# Patient Record
Sex: MALE
[De-identification: names, ages, dates, MRNs, and addresses within clinical notes are randomized per-mention and may not be internally consistent; named-entity substitution may affect disease eponyms.]

---

## 2017-05-22 ENCOUNTER — HOSPITAL (OUTPATIENT)
Dept: OTHER | Age: 77
End: 2017-05-22
Attending: FAMILY MEDICINE

## 2017-06-16 ENCOUNTER — HOSPITAL (OUTPATIENT)
Dept: OTHER | Age: 77
End: 2017-06-16
Attending: FAMILY MEDICINE

## 2017-06-16 LAB
DEVICE SN: NORMAL
POC_GLU: 79 MG/DL (ref 70–99)
TECH_ID: NORMAL

## 2017-08-09 ENCOUNTER — LAB ENCOUNTER (OUTPATIENT)
Dept: LAB | Facility: HOSPITAL | Age: 77
End: 2017-08-09
Attending: ORTHOPAEDIC SURGERY
Payer: MEDICARE

## 2017-08-09 DIAGNOSIS — Z01.818 PRE-OP TESTING: ICD-10-CM

## 2017-08-09 LAB
ANTIBODY SCREEN: NEGATIVE
RH BLOOD TYPE: POSITIVE

## 2017-08-09 PROCEDURE — 86850 RBC ANTIBODY SCREEN: CPT

## 2017-08-09 PROCEDURE — 86900 BLOOD TYPING SEROLOGIC ABO: CPT

## 2017-08-09 PROCEDURE — 86901 BLOOD TYPING SEROLOGIC RH(D): CPT

## 2017-08-09 PROCEDURE — 36415 COLL VENOUS BLD VENIPUNCTURE: CPT

## 2017-08-12 RX ORDER — TAMSULOSIN HYDROCHLORIDE 0.4 MG/1
0.4 CAPSULE ORAL DAILY
COMMUNITY

## 2017-08-12 RX ORDER — MV-MN/OM3/DHA/EPA/FISH/LUT/ZEA 250-5-1 MG
1 CAPSULE ORAL DAILY
COMMUNITY

## 2017-08-12 RX ORDER — GLUCOSAMINE SULFATE 500 MG
3 CAPSULE ORAL DAILY
COMMUNITY

## 2017-08-12 RX ORDER — VALACYCLOVIR HYDROCHLORIDE 500 MG/1
500 TABLET, FILM COATED ORAL DAILY
COMMUNITY

## 2017-08-12 RX ORDER — ACETAMINOPHEN 325 MG/1
650 TABLET ORAL ONCE
Status: CANCELLED | OUTPATIENT
Start: 2017-08-12 | End: 2017-08-12

## 2017-08-12 RX ORDER — VIT A/VIT C/VIT E/ZINC/COPPER 2148-113
1 TABLET ORAL DAILY
COMMUNITY

## 2017-08-12 RX ORDER — UBIDECARENONE 75 MG
250 CAPSULE ORAL DAILY
COMMUNITY

## 2017-08-12 RX ORDER — NIACIN 500 MG
500 TABLET ORAL 2 TIMES DAILY WITH MEALS
COMMUNITY

## 2017-08-12 RX ORDER — MULTIVITAMIN WITH IRON
100 TABLET ORAL DAILY
COMMUNITY

## 2017-08-12 RX ORDER — FAMOTIDINE 20 MG/1
20 TABLET ORAL ONCE
Status: CANCELLED | OUTPATIENT
Start: 2017-08-12 | End: 2017-08-12

## 2017-08-12 RX ORDER — LOVASTATIN 40 MG/1
40 TABLET ORAL DAILY
COMMUNITY

## 2017-08-12 RX ORDER — COVID-19 ANTIGEN TEST
220 KIT MISCELLANEOUS 2 TIMES DAILY
Status: ON HOLD | COMMUNITY
End: 2017-08-24

## 2017-08-12 RX ORDER — GLUCOSAMINE HCL 500 MG
1 TABLET ORAL DAILY
COMMUNITY

## 2017-08-12 RX ORDER — LISINOPRIL 10 MG/1
10 TABLET ORAL DAILY
COMMUNITY

## 2017-08-22 ENCOUNTER — APPOINTMENT (OUTPATIENT)
Dept: GENERAL RADIOLOGY | Facility: HOSPITAL | Age: 77
DRG: 470 | End: 2017-08-22
Attending: ORTHOPAEDIC SURGERY
Payer: MEDICARE

## 2017-08-22 ENCOUNTER — ANESTHESIA EVENT (OUTPATIENT)
Dept: SURGERY | Facility: HOSPITAL | Age: 77
DRG: 470 | End: 2017-08-22
Payer: MEDICARE

## 2017-08-22 ENCOUNTER — ANESTHESIA (OUTPATIENT)
Dept: SURGERY | Facility: HOSPITAL | Age: 77
DRG: 470 | End: 2017-08-22
Payer: MEDICARE

## 2017-08-22 ENCOUNTER — SURGERY (OUTPATIENT)
Age: 77
End: 2017-08-22

## 2017-08-22 ENCOUNTER — HOSPITAL ENCOUNTER (INPATIENT)
Facility: HOSPITAL | Age: 77
LOS: 2 days | Discharge: HOME HEALTH | DRG: 470 | End: 2017-08-24
Attending: ORTHOPAEDIC SURGERY | Admitting: ORTHOPAEDIC SURGERY
Payer: MEDICARE

## 2017-08-22 DIAGNOSIS — M16.11 PRIMARY OSTEOARTHRITIS OF RIGHT HIP: Primary | ICD-10-CM

## 2017-08-22 PROBLEM — I10 ESSENTIAL HYPERTENSION: Chronic | Status: ACTIVE | Noted: 2017-08-22

## 2017-08-22 PROBLEM — E78.5 HYPERLIPIDEMIA: Chronic | Status: ACTIVE | Noted: 2017-08-22

## 2017-08-22 PROBLEM — E11.9 DIABETES MELLITUS, TYPE 2 (HCC): Chronic | Status: ACTIVE | Noted: 2017-08-22

## 2017-08-22 LAB
GLUCOSE BLDC GLUCOMTR-MCNC: 104 MG/DL (ref 70–99)
GLUCOSE BLDC GLUCOMTR-MCNC: 180 MG/DL (ref 70–99)
GLUCOSE BLDC GLUCOMTR-MCNC: 193 MG/DL (ref 70–99)

## 2017-08-22 PROCEDURE — 0SR90JA REPLACEMENT OF RIGHT HIP JOINT WITH SYNTHETIC SUBSTITUTE, UNCEMENTED, OPEN APPROACH: ICD-10-PCS | Performed by: ORTHOPAEDIC SURGERY

## 2017-08-22 PROCEDURE — 73502 X-RAY EXAM HIP UNI 2-3 VIEWS: CPT | Performed by: ORTHOPAEDIC SURGERY

## 2017-08-22 PROCEDURE — 76001 XR C-ARM FLUORO >1 HOUR  (CPT=76001): CPT | Performed by: ORTHOPAEDIC SURGERY

## 2017-08-22 PROCEDURE — 99232 SBSQ HOSP IP/OBS MODERATE 35: CPT | Performed by: HOSPITALIST

## 2017-08-22 DEVICE — PINNACLE CANCELLOUS BONE SCREW 6.5MM X 30MM
Type: IMPLANTABLE DEVICE | Site: HIP | Status: FUNCTIONAL
Brand: PINNACLE

## 2017-08-22 DEVICE — PINNACLE HIP SOLUTIONS ALTRX POLYETHYLENE ACETABULAR LINER +4 NEUTRAL 36MM ID 58MM OD
Type: IMPLANTABLE DEVICE | Site: HIP | Status: FUNCTIONAL
Brand: PINNACLE ALTRX

## 2017-08-22 DEVICE — PINNACLE GRIPTION ACETABULAR SHELL SECTOR 58MM OD
Type: IMPLANTABLE DEVICE | Site: HIP | Status: FUNCTIONAL
Brand: PINNACLE GRIPTION

## 2017-08-22 DEVICE — PINNACLE CANCELLOUS BONE SCREW 6.5MM X 20MM
Type: IMPLANTABLE DEVICE | Site: HIP | Status: FUNCTIONAL
Brand: PINNACLE

## 2017-08-22 DEVICE — BIOLOX DELTA CERAMIC FEMORAL HEAD +1.5 36MM DIA 12/14 TAPER
Type: IMPLANTABLE DEVICE | Site: HIP | Status: FUNCTIONAL
Brand: BIOLOX DELTA

## 2017-08-22 DEVICE — PINNACLE CANCELLOUS BONE SCREW 6.5MM X 35MM
Type: IMPLANTABLE DEVICE | Site: HIP | Status: FUNCTIONAL
Brand: PINNACLE

## 2017-08-22 RX ORDER — SODIUM CHLORIDE, SODIUM LACTATE, POTASSIUM CHLORIDE, CALCIUM CHLORIDE 600; 310; 30; 20 MG/100ML; MG/100ML; MG/100ML; MG/100ML
INJECTION, SOLUTION INTRAVENOUS CONTINUOUS
Status: DISCONTINUED | OUTPATIENT
Start: 2017-08-22 | End: 2017-08-22 | Stop reason: ALTCHOICE

## 2017-08-22 RX ORDER — NALOXONE HYDROCHLORIDE 0.4 MG/ML
0.08 INJECTION, SOLUTION INTRAMUSCULAR; INTRAVENOUS; SUBCUTANEOUS
Status: ACTIVE | OUTPATIENT
Start: 2017-08-22 | End: 2017-08-23

## 2017-08-22 RX ORDER — HYDROMORPHONE HYDROCHLORIDE 1 MG/ML
0.8 INJECTION, SOLUTION INTRAMUSCULAR; INTRAVENOUS; SUBCUTANEOUS EVERY 2 HOUR PRN
Status: DISCONTINUED | OUTPATIENT
Start: 2017-08-22 | End: 2017-08-24

## 2017-08-22 RX ORDER — DIPHENHYDRAMINE HYDROCHLORIDE 50 MG/ML
12.5 INJECTION INTRAMUSCULAR; INTRAVENOUS EVERY 4 HOURS PRN
Status: DISCONTINUED | OUTPATIENT
Start: 2017-08-22 | End: 2017-08-24

## 2017-08-22 RX ORDER — GLUCOSAMINE SULFATE 500 MG
1 CAPSULE ORAL DAILY
Status: DISCONTINUED | OUTPATIENT
Start: 2017-08-22 | End: 2017-08-22 | Stop reason: RX

## 2017-08-22 RX ORDER — VALACYCLOVIR HYDROCHLORIDE 500 MG/1
500 TABLET, FILM COATED ORAL DAILY
Status: DISCONTINUED | OUTPATIENT
Start: 2017-08-22 | End: 2017-08-22 | Stop reason: RX

## 2017-08-22 RX ORDER — ONDANSETRON 2 MG/ML
4 INJECTION INTRAMUSCULAR; INTRAVENOUS ONCE AS NEEDED
Status: DISCONTINUED | OUTPATIENT
Start: 2017-08-22 | End: 2017-08-22 | Stop reason: HOSPADM

## 2017-08-22 RX ORDER — SCOLOPAMINE TRANSDERMAL SYSTEM 1 MG/1
1 PATCH, EXTENDED RELEASE TRANSDERMAL ONCE
Status: DISCONTINUED | OUTPATIENT
Start: 2017-08-22 | End: 2017-08-22

## 2017-08-22 RX ORDER — ALFUZOSIN HYDROCHLORIDE 10 MG/1
10 TABLET, EXTENDED RELEASE ORAL
Status: DISCONTINUED | OUTPATIENT
Start: 2017-08-23 | End: 2017-08-24

## 2017-08-22 RX ORDER — DIPHENHYDRAMINE HCL 25 MG
25 CAPSULE ORAL EVERY 4 HOURS PRN
Status: ACTIVE | OUTPATIENT
Start: 2017-08-22 | End: 2017-08-23

## 2017-08-22 RX ORDER — ASPIRIN 325 MG
325 TABLET ORAL 2 TIMES DAILY
Status: DISCONTINUED | OUTPATIENT
Start: 2017-08-23 | End: 2017-08-24

## 2017-08-22 RX ORDER — POLYETHYLENE GLYCOL 3350 17 G/17G
17 POWDER, FOR SOLUTION ORAL DAILY PRN
Status: DISCONTINUED | OUTPATIENT
Start: 2017-08-22 | End: 2017-08-24

## 2017-08-22 RX ORDER — HYDROMORPHONE HYDROCHLORIDE 1 MG/ML
0.5 INJECTION, SOLUTION INTRAMUSCULAR; INTRAVENOUS; SUBCUTANEOUS EVERY 5 MIN PRN
Status: DISCONTINUED | OUTPATIENT
Start: 2017-08-22 | End: 2017-08-24

## 2017-08-22 RX ORDER — LISINOPRIL 10 MG/1
10 TABLET ORAL DAILY
Status: DISCONTINUED | OUTPATIENT
Start: 2017-08-22 | End: 2017-08-24

## 2017-08-22 RX ORDER — METOCLOPRAMIDE 10 MG/1
10 TABLET ORAL ONCE
Status: DISCONTINUED | OUTPATIENT
Start: 2017-08-22 | End: 2017-08-22 | Stop reason: HOSPADM

## 2017-08-22 RX ORDER — ACETAMINOPHEN 325 MG/1
650 TABLET ORAL EVERY 6 HOURS
Status: DISCONTINUED | OUTPATIENT
Start: 2017-08-23 | End: 2017-08-24

## 2017-08-22 RX ORDER — ATORVASTATIN CALCIUM 40 MG/1
40 TABLET, FILM COATED ORAL NIGHTLY
Status: DISCONTINUED | OUTPATIENT
Start: 2017-08-22 | End: 2017-08-22 | Stop reason: ALTCHOICE

## 2017-08-22 RX ORDER — ONDANSETRON 2 MG/ML
4 INJECTION INTRAMUSCULAR; INTRAVENOUS EVERY 4 HOURS PRN
Status: DISCONTINUED | OUTPATIENT
Start: 2017-08-22 | End: 2017-08-24

## 2017-08-22 RX ORDER — TAMSULOSIN HYDROCHLORIDE 0.4 MG/1
0.4 CAPSULE ORAL DAILY
Status: DISCONTINUED | OUTPATIENT
Start: 2017-08-22 | End: 2017-08-22 | Stop reason: RX

## 2017-08-22 RX ORDER — VITS A,C,E/LUTEIN/MINERALS 300MCG-200
1 TABLET ORAL DAILY
Status: DISCONTINUED | OUTPATIENT
Start: 2017-08-22 | End: 2017-08-24

## 2017-08-22 RX ORDER — NIACIN 500 MG
500 TABLET ORAL 2 TIMES DAILY WITH MEALS
Status: DISCONTINUED | OUTPATIENT
Start: 2017-08-22 | End: 2017-08-24

## 2017-08-22 RX ORDER — VIT A/VIT C/VIT E/ZINC/COPPER 2148-113
1 TABLET ORAL DAILY
Status: DISCONTINUED | OUTPATIENT
Start: 2017-08-22 | End: 2017-08-22 | Stop reason: RX

## 2017-08-22 RX ORDER — TRAMADOL HYDROCHLORIDE 50 MG/1
50 TABLET ORAL 2 TIMES DAILY PRN
Status: ON HOLD | COMMUNITY
End: 2017-08-24

## 2017-08-22 RX ORDER — FAMOTIDINE 10 MG/ML
20 INJECTION, SOLUTION INTRAVENOUS 2 TIMES DAILY
Status: DISCONTINUED | OUTPATIENT
Start: 2017-08-22 | End: 2017-08-24

## 2017-08-22 RX ORDER — CELECOXIB 200 MG/1
200 CAPSULE ORAL EVERY 12 HOURS SCHEDULED
Status: DISCONTINUED | OUTPATIENT
Start: 2017-08-22 | End: 2017-08-24

## 2017-08-22 RX ORDER — DOCUSATE SODIUM 100 MG/1
100 CAPSULE, LIQUID FILLED ORAL 2 TIMES DAILY
Status: DISCONTINUED | OUTPATIENT
Start: 2017-08-22 | End: 2017-08-24

## 2017-08-22 RX ORDER — DEXTROSE MONOHYDRATE 25 G/50ML
50 INJECTION, SOLUTION INTRAVENOUS AS NEEDED
Status: DISCONTINUED | OUTPATIENT
Start: 2017-08-22 | End: 2017-08-22

## 2017-08-22 RX ORDER — MORPHINE SULFATE 2 MG/ML
2 INJECTION, SOLUTION INTRAMUSCULAR; INTRAVENOUS EVERY 10 MIN PRN
Status: DISCONTINUED | OUTPATIENT
Start: 2017-08-22 | End: 2017-08-22 | Stop reason: HOSPADM

## 2017-08-22 RX ORDER — HYDROMORPHONE HYDROCHLORIDE 1 MG/ML
0.2 INJECTION, SOLUTION INTRAMUSCULAR; INTRAVENOUS; SUBCUTANEOUS EVERY 5 MIN PRN
Status: DISCONTINUED | OUTPATIENT
Start: 2017-08-22 | End: 2017-08-22 | Stop reason: HOSPADM

## 2017-08-22 RX ORDER — HYDROMORPHONE HYDROCHLORIDE 1 MG/ML
0.4 INJECTION, SOLUTION INTRAMUSCULAR; INTRAVENOUS; SUBCUTANEOUS EVERY 5 MIN PRN
Status: DISCONTINUED | OUTPATIENT
Start: 2017-08-22 | End: 2017-08-22 | Stop reason: HOSPADM

## 2017-08-22 RX ORDER — SODIUM CHLORIDE, SODIUM LACTATE, POTASSIUM CHLORIDE, CALCIUM CHLORIDE 600; 310; 30; 20 MG/100ML; MG/100ML; MG/100ML; MG/100ML
INJECTION, SOLUTION INTRAVENOUS CONTINUOUS
Status: DISCONTINUED | OUTPATIENT
Start: 2017-08-22 | End: 2017-08-22 | Stop reason: HOSPADM

## 2017-08-22 RX ORDER — DIPHENHYDRAMINE HYDROCHLORIDE 50 MG/ML
12.5 INJECTION INTRAMUSCULAR; INTRAVENOUS EVERY 4 HOURS PRN
Status: ACTIVE | OUTPATIENT
Start: 2017-08-22 | End: 2017-08-23

## 2017-08-22 RX ORDER — ATORVASTATIN CALCIUM 10 MG/1
10 TABLET, FILM COATED ORAL NIGHTLY
Status: DISCONTINUED | OUTPATIENT
Start: 2017-08-22 | End: 2017-08-24

## 2017-08-22 RX ORDER — SODIUM CHLORIDE, SODIUM LACTATE, POTASSIUM CHLORIDE, CALCIUM CHLORIDE 600; 310; 30; 20 MG/100ML; MG/100ML; MG/100ML; MG/100ML
INJECTION, SOLUTION INTRAVENOUS CONTINUOUS
Status: DISCONTINUED | OUTPATIENT
Start: 2017-08-22 | End: 2017-08-24

## 2017-08-22 RX ORDER — HYDROMORPHONE HYDROCHLORIDE 1 MG/ML
0.2 INJECTION, SOLUTION INTRAMUSCULAR; INTRAVENOUS; SUBCUTANEOUS EVERY 2 HOUR PRN
Status: DISCONTINUED | OUTPATIENT
Start: 2017-08-22 | End: 2017-08-24

## 2017-08-22 RX ORDER — DEXAMETHASONE SODIUM PHOSPHATE 4 MG/ML
10 VIAL (ML) INJECTION ONCE
Status: COMPLETED | OUTPATIENT
Start: 2017-08-22 | End: 2017-08-22

## 2017-08-22 RX ORDER — HYDROMORPHONE HYDROCHLORIDE 1 MG/ML
0.4 INJECTION, SOLUTION INTRAMUSCULAR; INTRAVENOUS; SUBCUTANEOUS EVERY 2 HOUR PRN
Status: DISCONTINUED | OUTPATIENT
Start: 2017-08-22 | End: 2017-08-24

## 2017-08-22 RX ORDER — NALBUPHINE HCL 10 MG/ML
2.5 AMPUL (ML) INJECTION EVERY 4 HOURS PRN
Status: ACTIVE | OUTPATIENT
Start: 2017-08-22 | End: 2017-08-23

## 2017-08-22 RX ORDER — TRAMADOL HYDROCHLORIDE 50 MG/1
TABLET ORAL EVERY 6 HOURS
Status: DISCONTINUED | OUTPATIENT
Start: 2017-08-22 | End: 2017-08-24

## 2017-08-22 RX ORDER — PREGABALIN 75 MG/1
75 CAPSULE ORAL EVERY EVENING
Status: DISCONTINUED | OUTPATIENT
Start: 2017-08-22 | End: 2017-08-24

## 2017-08-22 RX ORDER — HALOPERIDOL 5 MG/ML
0.5 INJECTION INTRAMUSCULAR ONCE AS NEEDED
Status: ACTIVE | OUTPATIENT
Start: 2017-08-22 | End: 2017-08-22

## 2017-08-22 RX ORDER — SODIUM CHLORIDE 0.9 % (FLUSH) 0.9 %
10 SYRINGE (ML) INJECTION AS NEEDED
Status: DISCONTINUED | OUTPATIENT
Start: 2017-08-22 | End: 2017-08-24

## 2017-08-22 RX ORDER — PHENYLEPHRINE HCL 10 MG/ML
VIAL (ML) INJECTION AS NEEDED
Status: DISCONTINUED | OUTPATIENT
Start: 2017-08-22 | End: 2017-08-22 | Stop reason: SURG

## 2017-08-22 RX ORDER — BUPIVACAINE HYDROCHLORIDE 7.5 MG/ML
INJECTION, SOLUTION INTRASPINAL AS NEEDED
Status: DISCONTINUED | OUTPATIENT
Start: 2017-08-22 | End: 2017-08-22 | Stop reason: SURG

## 2017-08-22 RX ORDER — ACETAMINOPHEN 10 MG/ML
1000 INJECTION, SOLUTION INTRAVENOUS EVERY 6 HOURS
Status: COMPLETED | OUTPATIENT
Start: 2017-08-22 | End: 2017-08-23

## 2017-08-22 RX ORDER — DIPHENHYDRAMINE HCL 25 MG
25 CAPSULE ORAL EVERY 4 HOURS PRN
Status: DISCONTINUED | OUTPATIENT
Start: 2017-08-22 | End: 2017-08-24

## 2017-08-22 RX ORDER — MORPHINE SULFATE 4 MG/ML
4 INJECTION, SOLUTION INTRAMUSCULAR; INTRAVENOUS EVERY 10 MIN PRN
Status: DISCONTINUED | OUTPATIENT
Start: 2017-08-22 | End: 2017-08-22 | Stop reason: HOSPADM

## 2017-08-22 RX ORDER — MORPHINE SULFATE 4 MG/ML
6 INJECTION, SOLUTION INTRAMUSCULAR; INTRAVENOUS EVERY 10 MIN PRN
Status: DISCONTINUED | OUTPATIENT
Start: 2017-08-22 | End: 2017-08-22 | Stop reason: HOSPADM

## 2017-08-22 RX ORDER — HYDROMORPHONE HYDROCHLORIDE 1 MG/ML
0.6 INJECTION, SOLUTION INTRAMUSCULAR; INTRAVENOUS; SUBCUTANEOUS EVERY 5 MIN PRN
Status: DISCONTINUED | OUTPATIENT
Start: 2017-08-22 | End: 2017-08-22 | Stop reason: HOSPADM

## 2017-08-22 RX ORDER — HALOPERIDOL 5 MG/ML
0.25 INJECTION INTRAMUSCULAR ONCE AS NEEDED
Status: DISCONTINUED | OUTPATIENT
Start: 2017-08-22 | End: 2017-08-22 | Stop reason: HOSPADM

## 2017-08-22 RX ORDER — FAMOTIDINE 40 MG/1
40 TABLET, FILM COATED ORAL ONCE
Status: COMPLETED | OUTPATIENT
Start: 2017-08-22 | End: 2017-08-22

## 2017-08-22 RX ORDER — CYCLOBENZAPRINE HCL 5 MG
5 TABLET ORAL 2 TIMES DAILY
Status: ON HOLD | COMMUNITY
End: 2017-08-24

## 2017-08-22 RX ORDER — BISACODYL 10 MG
10 SUPPOSITORY, RECTAL RECTAL
Status: DISCONTINUED | OUTPATIENT
Start: 2017-08-22 | End: 2017-08-24

## 2017-08-22 RX ORDER — PREGABALIN 75 MG/1
75 CAPSULE ORAL ONCE
Status: COMPLETED | OUTPATIENT
Start: 2017-08-22 | End: 2017-08-22

## 2017-08-22 RX ORDER — ACYCLOVIR 400 MG/1
400 TABLET ORAL 2 TIMES DAILY
Status: DISCONTINUED | OUTPATIENT
Start: 2017-08-22 | End: 2017-08-24

## 2017-08-22 RX ORDER — CHOLECALCIFEROL (VITAMIN D3) 125 MCG
250 CAPSULE ORAL DAILY
Status: DISCONTINUED | OUTPATIENT
Start: 2017-08-22 | End: 2017-08-24

## 2017-08-22 RX ORDER — FAMOTIDINE 20 MG/1
20 TABLET ORAL 2 TIMES DAILY
Status: DISCONTINUED | OUTPATIENT
Start: 2017-08-22 | End: 2017-08-24

## 2017-08-22 RX ORDER — ACETAMINOPHEN 10 MG/ML
1000 INJECTION, SOLUTION INTRAVENOUS ONCE
Status: COMPLETED | OUTPATIENT
Start: 2017-08-22 | End: 2017-08-22

## 2017-08-22 RX ORDER — HYDROMORPHONE HYDROCHLORIDE 1 MG/ML
0.4 INJECTION, SOLUTION INTRAMUSCULAR; INTRAVENOUS; SUBCUTANEOUS
Status: ACTIVE | OUTPATIENT
Start: 2017-08-22 | End: 2017-08-23

## 2017-08-22 RX ORDER — DEXTROSE MONOHYDRATE 25 G/50ML
50 INJECTION, SOLUTION INTRAVENOUS AS NEEDED
Status: DISCONTINUED | OUTPATIENT
Start: 2017-08-22 | End: 2017-08-24

## 2017-08-22 RX ORDER — METOCLOPRAMIDE HYDROCHLORIDE 5 MG/ML
10 INJECTION INTRAMUSCULAR; INTRAVENOUS EVERY 6 HOURS PRN
Status: DISCONTINUED | OUTPATIENT
Start: 2017-08-22 | End: 2017-08-24

## 2017-08-22 RX ORDER — MAGNESIUM HYDROXIDE 1200 MG/15ML
LIQUID ORAL CONTINUOUS PRN
Status: DISCONTINUED | OUTPATIENT
Start: 2017-08-22 | End: 2017-08-22

## 2017-08-22 RX ORDER — SODIUM PHOSPHATE, DIBASIC AND SODIUM PHOSPHATE, MONOBASIC 7; 19 G/133ML; G/133ML
1 ENEMA RECTAL ONCE AS NEEDED
Status: DISCONTINUED | OUTPATIENT
Start: 2017-08-22 | End: 2017-08-24

## 2017-08-22 RX ORDER — MELATONIN
325
Status: DISCONTINUED | OUTPATIENT
Start: 2017-08-23 | End: 2017-08-24

## 2017-08-22 RX ORDER — DIPHENHYDRAMINE HYDROCHLORIDE 50 MG/ML
25 INJECTION INTRAMUSCULAR; INTRAVENOUS ONCE AS NEEDED
Status: ACTIVE | OUTPATIENT
Start: 2017-08-22 | End: 2017-08-22

## 2017-08-22 RX ORDER — LIDOCAINE HYDROCHLORIDE 10 MG/ML
INJECTION, SOLUTION EPIDURAL; INFILTRATION; INTRACAUDAL; PERINEURAL AS NEEDED
Status: DISCONTINUED | OUTPATIENT
Start: 2017-08-22 | End: 2017-08-22 | Stop reason: SURG

## 2017-08-22 RX ORDER — ONDANSETRON 2 MG/ML
4 INJECTION INTRAMUSCULAR; INTRAVENOUS ONCE AS NEEDED
Status: ACTIVE | OUTPATIENT
Start: 2017-08-22 | End: 2017-08-22

## 2017-08-22 RX ORDER — SENNOSIDES 8.6 MG
17.2 TABLET ORAL NIGHTLY
Status: DISCONTINUED | OUTPATIENT
Start: 2017-08-22 | End: 2017-08-24

## 2017-08-22 RX ORDER — KETAMINE HCL IN 0.9 % NACL 100MG/10ML
SYRINGE (ML) INTRAVENOUS AS NEEDED
Status: DISCONTINUED | OUTPATIENT
Start: 2017-08-22 | End: 2017-08-22 | Stop reason: SURG

## 2017-08-22 RX ORDER — CELECOXIB 200 MG/1
400 CAPSULE ORAL ONCE
Status: COMPLETED | OUTPATIENT
Start: 2017-08-22 | End: 2017-08-22

## 2017-08-22 RX ORDER — OXYCODONE HYDROCHLORIDE 5 MG/1
5 TABLET ORAL EVERY 4 HOURS PRN
Status: DISCONTINUED | OUTPATIENT
Start: 2017-08-22 | End: 2017-08-24

## 2017-08-22 RX ORDER — MULTIVITAMIN WITH IRON
100 TABLET ORAL DAILY
Status: DISCONTINUED | OUTPATIENT
Start: 2017-08-22 | End: 2017-08-24

## 2017-08-22 RX ORDER — MIDAZOLAM HYDROCHLORIDE 1 MG/ML
INJECTION INTRAMUSCULAR; INTRAVENOUS AS NEEDED
Status: DISCONTINUED | OUTPATIENT
Start: 2017-08-22 | End: 2017-08-22 | Stop reason: SURG

## 2017-08-22 RX ORDER — NALOXONE HYDROCHLORIDE 0.4 MG/ML
80 INJECTION, SOLUTION INTRAMUSCULAR; INTRAVENOUS; SUBCUTANEOUS AS NEEDED
Status: DISCONTINUED | OUTPATIENT
Start: 2017-08-22 | End: 2017-08-22 | Stop reason: HOSPADM

## 2017-08-22 RX ORDER — DEXAMETHASONE SODIUM PHOSPHATE 4 MG/ML
10 VIAL (ML) INJECTION ONCE
Status: COMPLETED | OUTPATIENT
Start: 2017-08-23 | End: 2017-08-23

## 2017-08-22 RX ORDER — HYDROMORPHONE HYDROCHLORIDE 1 MG/ML
0.6 INJECTION, SOLUTION INTRAMUSCULAR; INTRAVENOUS; SUBCUTANEOUS
Status: ACTIVE | OUTPATIENT
Start: 2017-08-22 | End: 2017-08-23

## 2017-08-22 RX ADMIN — ACETAMINOPHEN 1000 MG: 10 INJECTION, SOLUTION INTRAVENOUS at 11:05:00

## 2017-08-22 RX ADMIN — MIDAZOLAM HYDROCHLORIDE 1 MG: 1 INJECTION INTRAMUSCULAR; INTRAVENOUS at 11:04:00

## 2017-08-22 RX ADMIN — SODIUM CHLORIDE, SODIUM LACTATE, POTASSIUM CHLORIDE, CALCIUM CHLORIDE: 600; 310; 30; 20 INJECTION, SOLUTION INTRAVENOUS at 12:27:00

## 2017-08-22 RX ADMIN — BUPIVACAINE HYDROCHLORIDE 1.5 ML: 7.5 INJECTION, SOLUTION INTRASPINAL at 11:15:00

## 2017-08-22 RX ADMIN — KETAMINE HCL IN 0.9 % NACL 10 MG: 100MG/10ML SYRINGE (ML) INTRAVENOUS at 11:32:00

## 2017-08-22 RX ADMIN — KETAMINE HCL IN 0.9 % NACL 10 MG: 100MG/10ML SYRINGE (ML) INTRAVENOUS at 14:04:00

## 2017-08-22 RX ADMIN — LIDOCAINE HYDROCHLORIDE 15 MG: 10 INJECTION, SOLUTION EPIDURAL; INFILTRATION; INTRACAUDAL; PERINEURAL at 11:21:00

## 2017-08-22 RX ADMIN — KETAMINE HCL IN 0.9 % NACL 10 MG: 100MG/10ML SYRINGE (ML) INTRAVENOUS at 12:55:00

## 2017-08-22 RX ADMIN — SODIUM CHLORIDE, SODIUM LACTATE, POTASSIUM CHLORIDE, CALCIUM CHLORIDE: 600; 310; 30; 20 INJECTION, SOLUTION INTRAVENOUS at 14:45:00

## 2017-08-22 RX ADMIN — PHENYLEPHRINE HCL 80 MCG: 10 MG/ML VIAL (ML) INJECTION at 11:51:00

## 2017-08-22 RX ADMIN — PHENYLEPHRINE HCL 80 MCG: 10 MG/ML VIAL (ML) INJECTION at 11:36:00

## 2017-08-22 RX ADMIN — MIDAZOLAM HYDROCHLORIDE 1 MG: 1 INJECTION INTRAMUSCULAR; INTRAVENOUS at 11:10:00

## 2017-08-22 RX ADMIN — PHENYLEPHRINE HCL 80 MCG: 10 MG/ML VIAL (ML) INJECTION at 11:57:00

## 2017-08-22 RX ADMIN — SODIUM CHLORIDE, SODIUM LACTATE, POTASSIUM CHLORIDE, CALCIUM CHLORIDE: 600; 310; 30; 20 INJECTION, SOLUTION INTRAVENOUS at 11:02:00

## 2017-08-22 NOTE — ANESTHESIA PREPROCEDURE EVALUATION
Anesthesia PreOp Note    HPI:     Faustino Bardales is a 77year old male who presents for preoperative consultation requested by: Dung Bowling MD    Date of Surgery: 8/22/2017    Procedure(s):  HIP TOTAL ANTERIOR APPROACH  Indication: Osteoarthritis r 8/21/2017 at Unknown time   niacin 500 MG Oral Tab Take 500 mg by mouth 2 (two) times daily with meals. Disp:  Rfl:  8/21/2017 at Unknown time   lisinopril 10 MG Oral Tab Take 10 mg by mouth daily.  Disp:  Rfl:  8/21/2017 at Unknown time   Multiple Vitamins No    Drug use: Unknown     Other Topics Concern   None on file     Social History Narrative   None on file       Available pre-op labs reviewed. Lab Results  Component Value Date   PGLU 104 (H) 08/22/2017          Vital Signs:   Body mass index is 33

## 2017-08-22 NOTE — ANESTHESIA POSTPROCEDURE EVALUATION
Patient: Penny Nicholson    Procedure Summary     Date:  08/22/17 Room / Location:  [address] [place] [address] MAIN OR 04 / 300 [facility] MAIN OR    Anesthesia Start:  8683 Anesthesia Stop:  7375    Procedure:  HIP TOTAL ANTERIOR APPROACH (Right Hip) Diagnosis:  (Osteoarthritis right h

## 2017-08-22 NOTE — ANESTHESIA PROCEDURE NOTES
Spinal Block  Performed by: Izabela Barone by: Abimael Mann     Start Time:  8/22/2017 11:10 AM  End Time:  8/22/2017 11:18 AM  Site identification: surface landmarks    Reason for Block: at surgeon's request and post-op pain management

## 2017-08-22 NOTE — BRIEF OP NOTE
[facility] POST ANESTHESIA CARE UNIT  Operative Note     Dexter Hansen Location: OR   [facility] [id number] MRN [id number]   Admission Date 8/22/2017 Operation Date 8/22/2017   Attending Physician Farnaz Gallego MD Operating Physician Claribel Neville, Case:   Patient was met in the preoperative anesthesia are where all risks, benefits and alternatives were again reviewed with the patient and written consent obtained.   Anesthesia then placed a spinal without complication and the patient was taken back to sequentially reamed the acetabulum under C-arm visualization to appropriate depth with good bleeding bone and peripheral fit. We then impacted an acetabular cup into position with appropriate anteversion and inclination confirmed with C-arm.   We then plac appropriate position. The wound was then copiously irrigated with normal saline. Anterior hip capsule was then closed using 0 Vicryl. A Hemovac drain was then placed deep to the fascia with exit out the anterolateral thigh.   The fascia was then clos

## 2017-08-22 NOTE — PROGRESS NOTES
[facility] HOSP - [facility]    Progress Note    Sarina Nicholson Patient Status:  Surgery Admit    1940 MRN [id number]   Location [address] [facility] Attending Ivan Hernandez MD   Hosp Day # 0 PCP No primary care provid MEDS, MONITOR       Diabetes mellitus, type 2 ([facility] [address] [facility]  CONT HOME MEDS, MONITOR ACCU CHECKS. Hyperlipidemia  CONT HOME MEDS.          Results:   No results found for: WBC, HGB, HCT, PLT, CREATSERUM, BUN, NA, K, CL, CO2, GLU, CA, ALB, ALKPHO, BILT, TP, AS

## 2017-08-22 NOTE — H&P
Interval History & Physical Examination    Patient Name: Rhea Sim  MRN: [id number]  CSN: [id number]  YOB: 1940    Diagnosis: Right Hip DJD    Present Illness: 76M with chronic Right hip DJD recalcitrant to conservative treatment pre Rfl:  8/21/2017 at Unknown time   Multiple Vitamins-Minerals (OCUVITE ADULT 50+) Oral Cap Take 1 tablet by mouth daily.  Disp:  Rfl:  8/21/2017 at Unknown time         Current Facility-Administered Medications:  lactated ringers infusion  Intravenous Contin 1990    Alcohol use No       SYSTEM Check if Review is Normal Check if Physical Exam is Normal If not normal, please explain:   HEENT [X] [X]    NECK & BACK [X] [X]    HEART [X] [X]    LUNGS [X] [X]    ABDOMEN [X] [X]    UROGENITAL [X] [X]    EXTREMITIES [

## 2017-08-23 LAB
ERYTHROCYTE [DISTWIDTH] IN BLOOD BY AUTOMATED COUNT: 12.8 % (ref 11–15)
GLUCOSE BLDC GLUCOMTR-MCNC: 136 MG/DL (ref 70–99)
GLUCOSE BLDC GLUCOMTR-MCNC: 152 MG/DL (ref 70–99)
GLUCOSE BLDC GLUCOMTR-MCNC: 198 MG/DL (ref 70–99)
GLUCOSE BLDC GLUCOMTR-MCNC: 235 MG/DL (ref 70–99)
HBA1C MFR BLD: 5.5 % (ref 4–6)
HCT VFR BLD AUTO: 29 % (ref 41–52)
HGB BLD-MCNC: 9.9 G/DL (ref 13.5–17.5)
MCH RBC QN AUTO: 32.7 PG (ref 27–32)
MCHC RBC AUTO-ENTMCNC: 34.2 G/DL (ref 32–37)
MCV RBC AUTO: 95.7 FL (ref 80–100)
PLATELET # BLD AUTO: 142 K/UL (ref 140–400)
PMV BLD AUTO: 7.3 FL (ref 7.4–10.3)
RBC # BLD AUTO: 3.03 M/UL (ref 4.5–5.9)
WBC # BLD AUTO: 9.8 K/UL (ref 4–11)

## 2017-08-23 PROCEDURE — 99233 SBSQ HOSP IP/OBS HIGH 50: CPT | Performed by: HOSPITALIST

## 2017-08-23 NOTE — PLAN OF CARE
CARDIOVASCULAR - ADULT    • Absence of cardiac arrhythmias or at baseline Progressing        DISCHARGE PLANNING    • Discharge to home or other facility with appropriate resources Progressing        HEMATOLOGIC - ADULT    • Free from bleeding injury Progre

## 2017-08-23 NOTE — PROGRESS NOTES
[facility] Dosing Service  Antibiotic Dosing    Meka Dillon is a 77year old male for whom pharmacy is dosing Vancomycin for treatment of surgical prophylaxis.       Allergies: haloperidol    Vitals: /95 (BP Location: Right arm)   Pulse 104

## 2017-08-23 NOTE — PHYSICAL THERAPY NOTE
PHYSICAL THERAPY TREATMENT NOTE - INPATIENT    Room Number: 819/699-A       Presenting Problem: R KATHLEEN    Problem List  Principal Problem:    Primary osteoarthritis of right hip  Active Problems:    Essential hypertension    Diabetes mellitus, type 2 ([facility] Restriction: R lower extremity (Simultaneous filing. User may not have seen previous data.)        R Lower Extremity: Weight Bearing as Tolerated (Simultaneous filing.  User may not have seen previous data.)       PAIN ASSESSMENT   Ratin  Location: righ home   Goal #1 Patient is able to demonstrate supine - sit EOB @ level: modified independent   Goal #1   Current Status NT pt up in chair   Goal #2 Patient is able to demonstrate transfers Sit to/from Stand at assistance level: modified independent     [facility]

## 2017-08-23 NOTE — PHYSICAL THERAPY NOTE
PHYSICAL THERAPY HIP EVALUATION - INPATIENT     Room Number: 350/657-R  Evaluation Date: 8/23/2017  Type of Evaluation: Initial  Physician Order: PT Eval and Treat    Presenting Problem: R KATHLEEN  Reason for Therapy: Mobility Dysfunction and Discharge Dayanara hip  Active Problems:    Essential hypertension    Diabetes mellitus, type 2 ([facility]    Hyperlipidemia    Osteoarthritis of right hip      Past Medical History  Past Medical History:   Diagnosis Date   • BPH (benign prostatic hyperplasia)    • Cancer ([facility] 2 functional limits; pt limited to anterior hip precautions.     Lower extremity strength is within functional limits; did not formally assess secondary to his recent R KATHLEEN    BALANCE  Static Sitting: Normal  Dynamic Sitting: Normal  Static Standing: Fair + Goal #1 Patient is able to demonstrate supine - sit EOB @ level: modified independent   Goal #1   Current Status    Goal #2 Patient is able to demonstrate transfers Sit to/from Stand at assistance level: modified independent     Goal #2  Current Status

## 2017-08-23 NOTE — PLAN OF CARE
CARDIOVASCULAR - ADULT    • Absence of cardiac arrhythmias or at baseline Progressing        Diabetes/Glucose Control    • Glucose maintained within prescribed range Progressing        DISCHARGE PLANNING    • Discharge to home or other facility with approp

## 2017-08-23 NOTE — ANESTHESIA POST-OP FOLLOW-UP NOTE
MrMarito Hansen is POD#1 s/p right total hip. Denies headache or back pain, no LE weakness or numbness. Working with physical therapy.    Denies anesthetic concerns.     Nasim Jacobs M.D.

## 2017-08-23 NOTE — PROGRESS NOTES
[place] [facility] - [facility]    Progress Note    Bharathi Nicholson Patient Status:  Inpatient    1940 MRN [id number]   Location [facility] 4W/SW/SE Attending Zachary Nino MD   [facility] Day # 1 PCP No primary care provider on file.        Tally Pour sec # OF FLUOROGRAPHIC IMAGES:   4  Fluoroscopy was provided in the operating room.          Xr Hip W Or Wo Pelvis 2 Or 3 Views, Right (cpt=73502)    Result Date: 8/22/2017  CONCLUSION:   FLUOROSCOPY TIME:   88.0 sec # OF FLUOROGRAPHIC IMAGES:   4  * Multip

## 2017-08-23 NOTE — DISCHARGE PLANNING
ANAHI met with the pt. At bedside. The pt. Lives with his wife in a ranch home with a basement, which they don't use. The pt. Reports being independent prior to admission with adls and ambulation. The pt. Hasn't driven since June. The pt.  Has 2 children b

## 2017-08-23 NOTE — PROGRESS NOTES
[facility]    Progress Note    Bogdan Dorantesriveronicanickie Patient Status:  Surgery Admit    1940 MRN [id number]   Location [address] [facility] Attending Arturo Boswell MD   Hosp Day # 1 PCP No primary care provid Xr Hip W Or Wo Pelvis 2 Or 3 Views, Right (cpt=73502)    Result Date: 8/22/2017  CONCLUSION:  1. Expected postsurgical changes of right hip arthroplasty with radiographically uncomplicated hardware.  2. Mild left hip osteoarthritis with overgrowth of t

## 2017-08-23 NOTE — PROGRESS NOTES
Pharmacy Note: Dietary Supplement Discontinuation Per Policy    Glucosamine has been discontinued on Lena Lanickie per policy. This supplement may be restarted upon discharge using the medication reconciliation process.     Thank you,   Ita Myers

## 2017-08-23 NOTE — OCCUPATIONAL THERAPY NOTE
OCCUPATIONAL THERAPY EVALUATION - INPATIENT      Room Number: 094/638-D  Evaluation Date: 8/23/2017  Type of Evaluation: Initial  Presenting Problem: R KATHLEEN    Physician Order: IP Consult to Occupational Therapy  Reason for Therapy: ADL/IADL Dysfunction and supervised setting;Home with home health PT/OT (rehab vs. home with 24/7 and [facility])  OT Device Recommendations: TBD (May benefit from [facility] for LB dressing)    PLAN  OT Treatment Plan: Balance activities; ADL training;Functional transfer training;Patient/Family memory and it scares me. \"    Patient self-stated goal is: to go home    OCCUPATIONAL THERAPY EXAMINATION     OBJECTIVE  Precautions: KATHLEEN - anterior;Bed/chair alarm (Simultaneous filing.  User may not have seen previous data.)  Fall Risk: Standard fall risk None    AM-PAC Score:  Score: 21  Approx Degree of Impairment: 32.79%  Standardized Score (AM-PAC Scale): 44.27  CMS Modifier (G-Code): CJ    FUNCTIONAL TRANSFER ASSESSMENT  Supine to Sit : Not tested  Sit to Stand: Minimum assistance    Toilet Transfer: C

## 2017-08-24 VITALS
HEIGHT: 70 IN | OXYGEN SATURATION: 97 % | TEMPERATURE: 98 F | RESPIRATION RATE: 18 BRPM | BODY MASS INDEX: 32.5 KG/M2 | WEIGHT: 227 LBS | HEART RATE: 91 BPM | DIASTOLIC BLOOD PRESSURE: 55 MMHG | SYSTOLIC BLOOD PRESSURE: 159 MMHG

## 2017-08-24 LAB
ERYTHROCYTE [DISTWIDTH] IN BLOOD BY AUTOMATED COUNT: 13.1 % (ref 11–15)
GLUCOSE BLDC GLUCOMTR-MCNC: 101 MG/DL (ref 70–99)
GLUCOSE BLDC GLUCOMTR-MCNC: 115 MG/DL (ref 70–99)
HCT VFR BLD AUTO: 27.2 % (ref 41–52)
HGB BLD-MCNC: 9.2 G/DL (ref 13.5–17.5)
MCH RBC QN AUTO: 32.2 PG (ref 27–32)
MCHC RBC AUTO-ENTMCNC: 33.7 G/DL (ref 32–37)
MCV RBC AUTO: 95.8 FL (ref 80–100)
PLATELET # BLD AUTO: 131 K/UL (ref 140–400)
PMV BLD AUTO: 7.8 FL (ref 7.4–10.3)
RBC # BLD AUTO: 2.84 M/UL (ref 4.5–5.9)
WBC # BLD AUTO: 9.3 K/UL (ref 4–11)

## 2017-08-24 PROCEDURE — 99239 HOSP IP/OBS DSCHRG MGMT >30: CPT | Performed by: HOSPITALIST

## 2017-08-24 RX ORDER — IRON POLYSACCHARIDE COMPLEX 150 MG
150 CAPSULE ORAL 2 TIMES DAILY
Qty: 60 CAPSULE | Refills: 0 | Status: SHIPPED | OUTPATIENT
Start: 2017-08-24

## 2017-08-24 RX ORDER — ASPIRIN 325 MG
325 TABLET ORAL 2 TIMES DAILY
Qty: 60 TABLET | Refills: 0 | Status: SHIPPED | OUTPATIENT
Start: 2017-08-24

## 2017-08-24 RX ORDER — TRAMADOL HYDROCHLORIDE 50 MG/1
50 TABLET ORAL EVERY 6 HOURS PRN
Qty: 100 TABLET | Refills: 0 | Status: SHIPPED | OUTPATIENT
Start: 2017-09-07

## 2017-08-24 RX ORDER — TRAMADOL HYDROCHLORIDE 50 MG/1
50 TABLET ORAL EVERY 6 HOURS PRN
Qty: 100 TABLET | Refills: 0 | Status: SHIPPED | OUTPATIENT
Start: 2017-08-24

## 2017-08-24 NOTE — DISCHARGE SUMMARY
[place] [facility] - [facility]    Discharge Summary    Home Nicholson Patient Status:  Inpatient    1940 MRN [id number]   Location [facility] 4W/SW/SE Attending Herman Vásquez MD   [facility] Day # 2 PCP No primary care provider on file.      D they can provide 24 hour care at home      Essential hypertension  - cont home meds, monitor vitals and adjust as needed     Diabetes mellitus, type 2 ([facility] [address]  - cont home meds and monitor accuchecks     Dementia  - reorient frequently, family at bedside whe Refills:  0     niacin 500 MG Tabs      Take 500 mg by mouth 2 (two) times daily with meals. Refills:  0     PRESERVISION AREDS Tabs      Take 1 tablet by mouth daily. Refills:  0     OCUVITE ADULT 50+ Caps      Take 1 tablet by mouth daily.    Refills:

## 2017-08-24 NOTE — OCCUPATIONAL THERAPY NOTE
OCCUPATIONAL THERAPY TREATMENT NOTE - INPATIENT     Room Number: 815/097-H         Presenting Problem: R KATHLEEN    Problem List  Principal Problem:    Primary osteoarthritis of right hip  Active Problems:    Essential hypertension    Diabetes mellitus, type 2 pt did not recall therapist from meeting her from 30 minutes prior or parts of our conversation     Patient End of Session: Up in chair;Needs met;Call light within reach;RN aware of session/findings; All patient questions and concerns addressed; Alarm set  O ASSESSMENT  Supine to Sit : Not tested  Sit to Stand: Minimum assistance (cga)    Toilet Transfer: cga  Shower Transfer: NT  Chair Transfer: CGA  Car Transfer: NT    Bedroom Mobility: RW with cga/sba      FUNCTIONAL ADL ASSESSMENT  Grooming: in standing sb

## 2017-08-24 NOTE — DISCHARGE PLANNING
MD order received regarding HHC. Plan is for discharge home today 8/24. [facility] is aware.       [facility] ext [phone]

## 2017-08-24 NOTE — PLAN OF CARE
CARDIOVASCULAR - ADULT    • Absence of cardiac arrhythmias or at baseline Adequate for Discharge        Diabetes/Glucose Control    • Glucose maintained within prescribed range Adequate for Discharge        DISCHARGE PLANNING    • Discharge to home or othe

## 2017-08-24 NOTE — PHYSICAL THERAPY NOTE
PHYSICAL THERAPY TREATMENT NOTE - INPATIENT    Room Number: 279/678-X       Presenting Problem: R KATHLEEN    Problem List  Principal Problem:    Primary osteoarthritis of right hip  Active Problems:    Essential hypertension    Diabetes mellitus, type 2 ([facility] Jen    AM-PAC Score:  Raw Score: 18   PT Approx Degree of Impairment Score: 46.58%   Standardized Score (AM-PAC Scale): 43.63   CMS Modifier (G-Code): CK    FUNCTIONAL ABILITY STATUS  Gait Assessment   Gait Assistance:  (CGA)  Distance (ft): 2 x 100  As

## 2017-08-24 NOTE — PROGRESS NOTES
[place] [facility] - [facility]    Progress Note    Bogdan Nicholson Patient Status:  Inpatient    1940 MRN [id number]   Location [facility] 4W/SW/SE Attending Estela Evans MD   [facility] Day # 2 PCP No primary care provider on file.        Jason Ford 80.0 sec # OF FLUOROGRAPHIC IMAGES:   4  Fluoroscopy was provided in the operating room.          Xr Hip W Or Wo Pelvis 2 Or 3 Views, Right (cpt=73502)    Result Date: 8/22/2017  CONCLUSION:   FLUOROSCOPY TIME:   88.0 sec # OF FLUOROGRAPHIC IMAGES:   4  *

## 2017-08-25 ENCOUNTER — TELEPHONE (OUTPATIENT)
Dept: CARDIOLOGY UNIT | Facility: HOSPITAL | Age: 77
End: 2017-08-25

## 2019-07-21 ENCOUNTER — HOSPITAL (OUTPATIENT)
Dept: OTHER | Age: 79
End: 2019-07-21
Attending: INTERNAL MEDICINE

## 2019-07-21 LAB
A/G RATIO_: 0.7
ABS LYMPH: 1.1 K/CUMM (ref 1–3.5)
ABS MONO: 0.8 K/CUMM (ref 0.1–0.8)
ABS NEUTRO: 11.4 K/CUMM (ref 2–8)
ALBUMIN: 3.2 G/DL (ref 3.5–5)
ALK PHOS: 100 UNIT/L (ref 50–124)
ALT/GPT: 14 UNIT/L (ref 0–55)
ANION GAP SERPL CALC-SCNC: 18 MEQ/L (ref 10–20)
APTT PPP: 25 SECONDS (ref 22–30)
AST/GOT: 20 UNIT/L (ref 5–34)
BASOPHIL: 0 % (ref 0–1)
BILI TOTAL: 0.5 MG/DL (ref 0.2–1)
BUN SERPL-MCNC: 59 MG/DL (ref 6–20)
CALCIUM: 10.3 MG/DL (ref 8.4–10.2)
CHLORIDE: 103 MEQ/L (ref 97–107)
CREATININE: 4.39 MG/DL (ref 0.6–1.3)
DEVICE SN: ABNORMAL
DIFF_TYPE?: ABNORMAL
EOSINOPHIL: 3 % (ref 0–6)
GLOBULIN_: 4.6 G/DL (ref 2–4.1)
GLUCOSE LVL: 117 MG/DL (ref 70–99)
HCT VFR BLD CALC: 37 % (ref 36–51)
HEMOLYSIS 1+: NEGATIVE
HEMOLYSIS 2+: ABNORMAL
HEMOLYSIS 2+: NEGATIVE
HEMOLYSIS 2+: NEGATIVE
HEMOLYSIS 4+: NEGATIVE
HEMOLYSIS 4+: NORMAL
HGB BLD-MCNC: 11.9 G/DL (ref 12–17)
ICTERIC 4+: NEGATIVE
IMMATURE GRAN: 0.7 % (ref 0–0.3)
INR: 1 (ref 0.9–1.1)
INSTR WBC: 13.8 K/CUMM (ref 4–11)
LACTIC ACID: 1.5 MMOL/L (ref 0.5–2.2)
LACTIC ACID: 1.8 MMOL/L (ref 0.5–2.2)
LACTIC ACID: 2.2 MMOL/L (ref 0.5–2.2)
LDH SERPL L TO P-CCNC: 119 UNIT/L (ref 125–220)
LIPEMIC 1+: NEGATIVE
LIPEMIC 2+: NEGATIVE
LIPEMIC 3+: NEGATIVE
LYMPHOCYTE: 8 %
MCH RBC QN AUTO: 30 PG (ref 25–35)
MCHC RBC AUTO-ENTMCNC: 32 G/DL (ref 32–37)
MCV RBC AUTO: 94 FL (ref 78–97)
MONOCYTE: 6 %
NEUTROPHIL: 82 %
NRBC BLD MANUAL-RTO: 0 % (ref 0–0.2)
PLATELET: 156 K/CUMM (ref 150–450)
PLT ESTIMATE: ADEQUATE
POC_GLU: 107 MG/DL (ref 70–99)
POTASSIUM: 5.2 MEQ/L (ref 3.5–5.1)
POTASSIUM: 6.3 MEQ/L (ref 3.5–5.1)
PROTHROMBIN TIME: 10.2 SECONDS (ref 9.7–11.6)
RBC # BLD: 3.95 M/CUMM (ref 4.2–6)
RBC MORPH: NORMAL
RDW: 13.9 % (ref 11.5–14.5)
SODIUM: 137 MEQ/L (ref 136–145)
TCO2: 22 MEQ/L (ref 19–29)
TECH_ID: ABNORMAL
TOTAL CK: 61 U/L (ref 30–200)
TOTAL PROTEIN: 7.8 G/DL (ref 6.4–8.3)
TROPONIN I: 0.02 NG/ML
U CREATININE: 171.76 MG/DL
U SODIUM: 46 MEQ/L
UA APPEAR: CLEAR
UA APPEAR: CLEAR
UA BACTERIA: ABNORMAL
UA BACTERIA: ABNORMAL
UA BILI: NEGATIVE
UA BILI: NEGATIVE
UA BLOOD: NEGATIVE
UA BLOOD: NEGATIVE
UA CAST: ABNORMAL
UA COLOR: YELLOW
UA COLOR: YELLOW
UA EPITHELIAL: ABNORMAL
UA EPITHELIAL: ABNORMAL
UA GLUCOSE: NEGATIVE
UA GLUCOSE: NEGATIVE
UA KETONES: ABNORMAL
UA KETONES: ABNORMAL
UA LEUK EST: NEGATIVE
UA LEUK EST: NEGATIVE
UA NITRITE: NEGATIVE
UA NITRITE: NEGATIVE
UA PH: 5 (ref 5–7)
UA PH: 5.5 (ref 5–7)
UA PROTEIN: ABNORMAL
UA PROTEIN: ABNORMAL
UA RBC: ABNORMAL
UA RBC: ABNORMAL
UA SPEC GRAV: 1.02 (ref 1.01–1.02)
UA SPEC GRAV: 1.02 (ref 1.01–1.02)
UA UROBILINOGEN: 0.2 MG/DL (ref 0.2–1)
UA UROBILINOGEN: 0.2 MG/DL (ref 0.2–1)
UA WBC: ABNORMAL
UA WBC: ABNORMAL
URIC ACID: 11.7 MG/DL (ref 3.5–7.2)
WBC # BLD: 13.8 K/CUMM (ref 4–11)

## 2019-07-21 PROCEDURE — 93010 ELECTROCARDIOGRAM REPORT: CPT | Performed by: INTERNAL MEDICINE

## 2019-07-22 LAB
ABS LYMPH: 1.1 K/CUMM (ref 1–3.5)
ABS MONO: 0.9 K/CUMM (ref 0.1–0.8)
ABS NEUTRO: 8.4 K/CUMM (ref 2–8)
ANION GAP SERPL CALC-SCNC: 13 MEQ/L (ref 10–20)
BASOPHIL: 0 % (ref 0–1)
BUN SERPL-MCNC: 49 MG/DL (ref 6–20)
CALCIUM: 9.7 MG/DL (ref 8.4–10.2)
CHLORIDE: 107 MEQ/L (ref 97–107)
CREATININE: 3.96 MG/DL (ref 0.6–1.3)
DEVICE SN: ABNORMAL
DEVICE SN: NORMAL
DIFF_TYPE?: ABNORMAL
EOSINOPHIL: 5 % (ref 0–6)
GLUCOSE LVL: 94 MG/DL (ref 70–99)
HCT VFR BLD CALC: 34 % (ref 36–51)
HEMOLYSIS 2+: NEGATIVE
HEMOLYSIS 2+: NEGATIVE
HGB BLD-MCNC: 11.1 G/DL (ref 12–17)
IMMATURE GRAN: 0.4 % (ref 0–0.3)
INSTR WBC: 10.9 K/CUMM (ref 4–11)
LYMPHOCYTE: 10 %
MAGNESIUM LEVEL: 1.2 MG/DL (ref 1.6–2.6)
MCH RBC QN AUTO: 30 PG (ref 25–35)
MCHC RBC AUTO-ENTMCNC: 32 G/DL (ref 32–37)
MCV RBC AUTO: 94 FL (ref 78–97)
MONOCYTE: 8 %
NEUTROPHIL: 76 %
NRBC BLD MANUAL-RTO: 0 % (ref 0–0.2)
PLATELET: 146 K/CUMM (ref 150–450)
POC_GLU: 118 MG/DL (ref 70–99)
POC_GLU: 127 MG/DL (ref 70–99)
POC_GLU: 132 MG/DL (ref 70–99)
POC_GLU: 93 MG/DL (ref 70–99)
POTASSIUM: 5.1 MEQ/L (ref 3.5–5.1)
RBC # BLD: 3.67 M/CUMM (ref 4.2–6)
RDW: 13.6 % (ref 11.5–14.5)
SODIUM: 140 MEQ/L (ref 136–145)
TCO2: 25 MEQ/L (ref 19–29)
TECH_ID: ABNORMAL
TECH_ID: NORMAL
WBC # BLD: 10.9 K/CUMM (ref 4–11)

## 2019-07-23 LAB
A/G RATIO_: 0.6
ABG GLU: 122 MG/G (ref 65–95)
ABG HCT: 36 % (ref 37–50)
ABG ION CALCIUM: 5.1 MG/DL (ref 4.5–5.3)
ABG K: 5.05 MMOL/L (ref 3.5–5.3)
ABG LACTIC ACID: 0.62 (ref 0.5–2.2)
ABG NA: 141.3 MMOL/L (ref 135–148)
ABS LYMPH: 1.1 K/CUMM (ref 1–3.5)
ABS MONO: 0.8 K/CUMM (ref 0.1–0.8)
ABS NEUTRO: 6.3 K/CUMM (ref 2–8)
ALBUMIN: 2.5 G/DL (ref 3.5–5)
ALK PHOS: 96 UNIT/L (ref 50–124)
ALLEN'S TEST: POSITIVE
ALLEN'S TEST: POSITIVE
ALT/GPT: 11 UNIT/L (ref 0–55)
ANALYZER: ABNORMAL
ANALYZER: NORMAL
ANION GAP SERPL CALC-SCNC: 14 MEQ/L (ref 10–20)
AST/GOT: 14 UNIT/L (ref 5–34)
BASOPHIL: 0 % (ref 0–1)
BEVT: -0.1 MMOL/L (ref -2–3)
BILI TOTAL: 0.3 MG/DL (ref 0.2–1)
BUN SERPL-MCNC: 45 MG/DL (ref 6–20)
CALCIUM: 9.6 MG/DL (ref 8.4–10.2)
CHLORIDE: 111 MEQ/L (ref 97–107)
COHB: 0 % (ref 0.5–1.5)
CREATININE: 3.43 MG/DL (ref 0.6–1.3)
DEVICE SN: ABNORMAL
DEVICE SN: NORMAL
DIFF_TYPE?: ABNORMAL
DRAW SITE: ABNORMAL
DRAW SITE: NORMAL
EOSINOPHIL: 5 % (ref 0–6)
GLOBULIN_: 3.9 G/DL (ref 2–4.1)
GLUCOSE LVL: 106 MG/DL (ref 70–99)
HCO3: 25.8 MMOL/L (ref 22–26)
HCT VFR BLD CALC: 35 % (ref 36–51)
HEMOLYSIS 2+: NEGATIVE
HEMOLYSIS 2+: NEGATIVE
HEMOLYSIS 3+: NEGATIVE
HGB BLD-MCNC: 10.8 G/DL (ref 12–17)
IMMATURE GRAN: 0.5 % (ref 0–0.3)
INSTR WBC: 8.7 K/CUMM (ref 4–11)
LIPEMIC 3+: NEGATIVE
LIPEMIC 4+: NEGATIVE
LYMPHOCYTE: 12 %
MAGNESIUM LEVEL: 1.9 MG/DL (ref 1.6–2.6)
MCH RBC QN AUTO: 30 PG (ref 25–35)
MCHC RBC AUTO-ENTMCNC: 30 G/DL (ref 32–37)
MCV RBC AUTO: 97 FL (ref 78–97)
METHB: 0.6 % (ref 0–1.5)
MODE: ABNORMAL
MONOCYTE: 9 %
NEUTROPHIL: 73 %
NRBC BLD MANUAL-RTO: 0 % (ref 0–0.2)
O2 SAT(EST): 94 %
O2HB: 93.4 % (ref 94–97)
PCO2: 46.9 MMHG (ref 35–45)
PH: 7.36 (ref 7.35–7.45)
PHOSPHORUS: 2.7 MG/DL (ref 2.3–4.7)
PLATELET: 138 K/CUMM (ref 150–450)
PO2: 73.2 MMHG (ref 75–100)
POC_GLU: 106 MG/DL (ref 70–99)
POC_GLU: 112 MG/DL (ref 70–99)
POC_GLU: 114 MG/DL (ref 70–99)
POC_GLU: 119 MG/DL (ref 70–99)
POC_GLU: 99 MG/DL (ref 70–99)
POTASSIUM: 5.1 MEQ/L (ref 3.5–5.1)
RBC # BLD: 3.66 M/CUMM (ref 4.2–6)
RDW: 13.7 % (ref 11.5–14.5)
REPORT STATUS: 2 L/MIN
SAMPLE TYPE: ABNORMAL
SAMPLE TYPE: NORMAL
SODIUM: 143 MEQ/L (ref 136–145)
TCO2: 23 MEQ/L (ref 19–29)
TECH ID: 9266
TECH ID: 9266
TECH_ID: ABNORMAL
TECH_ID: NORMAL
THB: 12.3 G/DL (ref 12–18)
TOTAL PROTEIN: 6.4 G/DL (ref 6.4–8.3)
WBC # BLD: 8.7 K/CUMM (ref 4–11)

## 2019-07-24 LAB
ABS LYMPH: 1.4 K/CUMM (ref 1–3.5)
ABS MONO: 0.8 K/CUMM (ref 0.1–0.8)
ABS NEUTRO: 6.8 K/CUMM (ref 2–8)
ANION GAP SERPL CALC-SCNC: 11 MEQ/L (ref 10–20)
BASOPHIL: 0 % (ref 0–1)
BUN SERPL-MCNC: 35 MG/DL (ref 6–20)
CALCIUM: 9.9 MG/DL (ref 8.4–10.2)
CHLORIDE: 107 MEQ/L (ref 97–107)
CREATININE: 2.51 MG/DL (ref 0.6–1.3)
DEVICE SN: ABNORMAL
DIFF_TYPE?: ABNORMAL
EOSINOPHIL: 6 % (ref 0–6)
GLUCOSE LVL: 107 MG/DL (ref 70–99)
HCT VFR BLD CALC: 38 % (ref 36–51)
HEMOLYSIS 2+: NEGATIVE
HEMOLYSIS 2+: NEGATIVE
HEMOLYSIS 3+: NEGATIVE
HGB BLD-MCNC: 11.8 G/DL (ref 12–17)
IMMATURE GRAN: 0.4 % (ref 0–0.3)
INSTR WBC: 9.5 K/CUMM (ref 4–11)
LIPEMIC 4+: NEGATIVE
LYMPHOCYTE: 14 %
MAGNESIUM LEVEL: 1.5 MG/DL (ref 1.6–2.6)
MCH RBC QN AUTO: 30 PG (ref 25–35)
MCHC RBC AUTO-ENTMCNC: 31 G/DL (ref 32–37)
MCV RBC AUTO: 96 FL (ref 78–97)
MONOCYTE: 8 %
NEUTROPHIL: 71 %
NRBC BLD MANUAL-RTO: 0 % (ref 0–0.2)
PHOSPHORUS: 2.9 MG/DL (ref 2.3–4.7)
PLATELET: 178 K/CUMM (ref 150–450)
POC_GLU: 102 MG/DL (ref 70–99)
POC_GLU: 104 MG/DL (ref 70–99)
POC_GLU: 108 MG/DL (ref 70–99)
POC_GLU: 135 MG/DL (ref 70–99)
POTASSIUM: 5.1 MEQ/L (ref 3.5–5.1)
RBC # BLD: 4 M/CUMM (ref 4.2–6)
RDW: 13.4 % (ref 11.5–14.5)
SODIUM: 141 MEQ/L (ref 136–145)
TCO2: 28 MEQ/L (ref 19–29)
TECH_ID: ABNORMAL
WBC # BLD: 9.5 K/CUMM (ref 4–11)

## 2019-07-25 LAB
ABS LYMPH: 1.4 K/CUMM (ref 1–3.5)
ABS MONO: 0.7 K/CUMM (ref 0.1–0.8)
ABS NEUTRO: 5.6 K/CUMM (ref 2–8)
ANION GAP SERPL CALC-SCNC: 10 MEQ/L (ref 10–20)
BASOPHIL: 0 % (ref 0–1)
BUN SERPL-MCNC: 24 MG/DL (ref 6–20)
CALCIUM: 9.7 MG/DL (ref 8.4–10.2)
CHLORIDE: 105 MEQ/L (ref 97–107)
CREATININE: 1.92 MG/DL (ref 0.6–1.3)
DEVICE SN: ABNORMAL
DEVICE SN: ABNORMAL
DEVICE SN: NORMAL
DIFF_TYPE?: ABNORMAL
EOSINOPHIL: 5 % (ref 0–6)
GLUCOSE LVL: 103 MG/DL (ref 70–99)
HCT VFR BLD CALC: 36 % (ref 36–51)
HEMOLYSIS 2+: NEGATIVE
HEMOLYSIS 2+: NEGATIVE
HEMOLYSIS 3+: NEGATIVE
HGB BLD-MCNC: 11.1 G/DL (ref 12–17)
IMMATURE GRAN: 0.6 % (ref 0–0.3)
INSTR WBC: 8.1 K/CUMM (ref 4–11)
LIPEMIC 4+: NEGATIVE
LYMPHOCYTE: 17 %
MAGNESIUM LEVEL: 1.8 MG/DL (ref 1.6–2.6)
MCH RBC QN AUTO: 30 PG (ref 25–35)
MCHC RBC AUTO-ENTMCNC: 31 G/DL (ref 32–37)
MCV RBC AUTO: 95 FL (ref 78–97)
MONOCYTE: 8 %
NEUTROPHIL: 69 %
NRBC BLD MANUAL-RTO: 0 % (ref 0–0.2)
PHOSPHORUS: 2.6 MG/DL (ref 2.3–4.7)
PLATELET: 165 K/CUMM (ref 150–450)
POC_GLU: 106 MG/DL (ref 70–99)
POC_GLU: 131 MG/DL (ref 70–99)
POC_GLU: 96 MG/DL (ref 70–99)
POTASSIUM: 4.3 MEQ/L (ref 3.5–5.1)
RBC # BLD: 3.75 M/CUMM (ref 4.2–6)
RDW: 13.4 % (ref 11.5–14.5)
SODIUM: 139 MEQ/L (ref 136–145)
TCO2: 28 MEQ/L (ref 19–29)
TECH_ID: ABNORMAL
TECH_ID: ABNORMAL
TECH_ID: NORMAL
WBC # BLD: 8.1 K/CUMM (ref 4–11)

## 2019-08-13 ENCOUNTER — HOSPITAL (OUTPATIENT)
Dept: OTHER | Age: 79
End: 2019-08-13

## 2019-08-13 LAB
ABS LYMPH: 1.5 K/CUMM (ref 1–3.5)
ABS MONO: 0.6 K/CUMM (ref 0.1–0.8)
ABS NEUTRO: 5.4 K/CUMM (ref 2–8)
BASOPHIL: 0 % (ref 0–1)
DIFF_TYPE?: ABNORMAL
EOSINOPHIL: 4 % (ref 0–6)
HCT VFR BLD CALC: 39 % (ref 36–51)
HGB BLD-MCNC: 12.3 G/DL (ref 12–17)
IMMATURE GRAN: 0.4 % (ref 0–0.3)
INSTR WBC: 8 K/CUMM (ref 4–11)
LYMPHOCYTE: 19 %
MCH RBC QN AUTO: 30 PG (ref 25–35)
MCHC RBC AUTO-ENTMCNC: 31 G/DL (ref 32–37)
MCV RBC AUTO: 95 FL (ref 78–97)
MONOCYTE: 8 %
NEUTROPHIL: 68 %
NRBC BLD MANUAL-RTO: 0 % (ref 0–0.2)
PLATELET: 205 K/CUMM (ref 150–450)
RBC # BLD: 4.11 M/CUMM (ref 4.2–6)
RDW: 13.8 % (ref 11.5–14.5)
WBC # BLD: 8 K/CUMM (ref 4–11)

## 2019-08-16 ENCOUNTER — HOSPITAL (OUTPATIENT)
Dept: OTHER | Age: 79
End: 2019-08-16
Attending: HOSPITALIST

## 2019-08-16 LAB
A/G RATIO_: 0.9
ABS LYMPH: 2 K/CUMM (ref 1–3.5)
ABS MONO: 0.6 K/CUMM (ref 0.1–0.8)
ABS NEUTRO: 3.6 K/CUMM (ref 2–8)
ALBUMIN: 3.3 G/DL (ref 3.5–5)
ALK PHOS: 94 UNIT/L (ref 50–124)
ALT/GPT: 16 UNIT/L (ref 0–55)
ANION GAP SERPL CALC-SCNC: 15 MEQ/L (ref 10–20)
AST/GOT: 19 UNIT/L (ref 5–34)
BASOPHIL: 1 % (ref 0–1)
BILI TOTAL: 0.4 MG/DL (ref 0.2–1)
BUN SERPL-MCNC: 29 MG/DL (ref 6–20)
CALCIUM: 9.8 MG/DL (ref 8.4–10.2)
CHLORIDE: 102 MEQ/L (ref 97–107)
CREATININE: 1.42 MG/DL (ref 0.6–1.3)
DIFF_TYPE?: ABNORMAL
EOSINOPHIL: 6 % (ref 0–6)
GLOBULIN_: 3.5 G/DL (ref 2–4.1)
GLUCOSE LVL: 82 MG/DL (ref 70–99)
HCT VFR BLD CALC: 35 % (ref 36–51)
HEMOLYSIS 2+: ABNORMAL
HEMOLYSIS 4+: NORMAL
HGB BLD-MCNC: 11.3 G/DL (ref 12–17)
ICTERIC 4+: NEGATIVE
IMMATURE GRAN: 0.3 % (ref 0–0.3)
INSTR WBC: 6.7 K/CUMM (ref 4–11)
LIPEMIC 3+: NEGATIVE
LYMPHOCYTE: 31 %
MCH RBC QN AUTO: 30 PG (ref 25–35)
MCHC RBC AUTO-ENTMCNC: 32 G/DL (ref 32–37)
MCV RBC AUTO: 93 FL (ref 78–97)
MONOCYTE: 9 %
NEUTROPHIL: 53 %
NRBC BLD MANUAL-RTO: 0 % (ref 0–0.2)
PLATELET: 153 K/CUMM (ref 150–450)
POTASSIUM: 4.9 MEQ/L (ref 3.5–5.1)
RBC # BLD: 3.77 M/CUMM (ref 4.2–6)
RDW: 13.4 % (ref 11.5–14.5)
SODIUM: 136 MEQ/L (ref 136–145)
TCO2: 24 MEQ/L (ref 19–29)
TOTAL PROTEIN: 6.8 G/DL (ref 6.4–8.3)
TROPONIN I: 0.01 NG/ML
WBC # BLD: 6.7 K/CUMM (ref 4–11)

## 2019-08-16 PROCEDURE — 93010 ELECTROCARDIOGRAM REPORT: CPT | Performed by: INTERNAL MEDICINE

## 2019-08-17 LAB
ABS LYMPH: 1.9 K/CUMM (ref 1–3.5)
ABS MONO: 0.6 K/CUMM (ref 0.1–0.8)
ABS NEUTRO: 3.2 K/CUMM (ref 2–8)
ANION GAP SERPL CALC-SCNC: 13 MEQ/L (ref 10–20)
BASOPHIL: 1 % (ref 0–1)
BUN SERPL-MCNC: 28 MG/DL (ref 6–20)
CALCIUM: 9.8 MG/DL (ref 8.4–10.2)
CHLORIDE: 104 MEQ/L (ref 97–107)
CREATININE: 1.34 MG/DL (ref 0.6–1.3)
DEVICE SN: NORMAL
DEVICE SN: NORMAL
DIFF_TYPE?: ABNORMAL
EOSINOPHIL: 6 % (ref 0–6)
GLUCOSE LVL: 79 MG/DL (ref 70–99)
HCT VFR BLD CALC: 37 % (ref 36–51)
HEMOLYSIS 2+: NEGATIVE
HEMOLYSIS 2+: NEGATIVE
HEMOLYSIS 4+: NEGATIVE
HEMOLYSIS 4+: NEGATIVE
HGB BLD-MCNC: 11.5 G/DL (ref 12–17)
ICTERIC 4+: NEGATIVE
ICTERIC 4+: NEGATIVE
IMMATURE GRAN: 0.3 % (ref 0–0.3)
INSTR WBC: 6.2 K/CUMM (ref 4–11)
LYMPHOCYTE: 31 %
MAGNESIUM LEVEL: 1.8 MG/DL (ref 1.6–2.6)
MCH RBC QN AUTO: 29 PG (ref 25–35)
MCHC RBC AUTO-ENTMCNC: 31 G/DL (ref 32–37)
MCV RBC AUTO: 94 FL (ref 78–97)
MONOCYTE: 9 %
NEUTROPHIL: 52 %
NRBC BLD MANUAL-RTO: 0 % (ref 0–0.2)
PLATELET: 142 K/CUMM (ref 150–450)
POC_GLU: 73 MG/DL (ref 70–99)
POC_GLU: 84 MG/DL (ref 70–99)
POTASSIUM: 4.3 MEQ/L (ref 3.5–5.1)
RBC # BLD: 3.92 M/CUMM (ref 4.2–6)
RDW: 13.4 % (ref 11.5–14.5)
SODIUM: 139 MEQ/L (ref 136–145)
TCO2: 26 MEQ/L (ref 19–29)
TECH_ID: NORMAL
TECH_ID: NORMAL
TROPONIN I: 0.01 NG/ML
TROPONIN I: 0.01 NG/ML
WBC # BLD: 6.2 K/CUMM (ref 4–11)

## 2019-08-17 PROCEDURE — 93010 ELECTROCARDIOGRAM REPORT: CPT | Performed by: INTERNAL MEDICINE

## 2019-08-17 PROCEDURE — 99219 INITIAL OBSERVATION CARE,LEVL II: CPT | Performed by: HOSPITALIST

## 2019-09-25 ENCOUNTER — HOSPITAL (OUTPATIENT)
Dept: OTHER | Age: 79
End: 2019-09-25

## 2019-10-16 ENCOUNTER — HOSPITAL (OUTPATIENT)
Dept: OTHER | Age: 79
End: 2019-10-16
Attending: INTERNAL MEDICINE

## 2019-10-16 LAB
A/G RATIO_: 0.8
ABS LYMPH: 1.8 K/CUMM (ref 1–3.5)
ABS MONO: 0.7 K/CUMM (ref 0.1–0.8)
ABS NEUTRO: 9.1 K/CUMM (ref 2–8)
ALBUMIN: 2.5 G/DL (ref 3.5–5)
ALK PHOS: 109 UNIT/L (ref 50–124)
ALT/GPT: 8 UNIT/L (ref 0–55)
ANION GAP SERPL CALC-SCNC: 13 MEQ/L (ref 10–20)
APTT PPP: 26 SECONDS (ref 22–30)
AST/GOT: 10 UNIT/L (ref 5–34)
BASOPHIL: 0 % (ref 0–1)
BILI TOTAL: 0.4 MG/DL (ref 0.2–1)
BUN SERPL-MCNC: 22 MG/DL (ref 6–20)
CALCIUM: 8.9 MG/DL (ref 8.4–10.2)
CHLORIDE: 107 MEQ/L (ref 97–107)
CREATININE: 1.5 MG/DL (ref 0.6–1.3)
DIFF_TYPE?: ABNORMAL
EOSINOPHIL: 1 % (ref 0–6)
GLOBULIN_: 3.3 G/DL (ref 2–4.1)
GLUCOSE LVL: 91 MG/DL (ref 70–99)
HCT VFR BLD CALC: 38 % (ref 36–51)
HEMOLYSIS 2+: NEGATIVE
HEMOLYSIS 4+: NEGATIVE
HGB BLD-MCNC: 11.7 G/DL (ref 12–17)
ICTERIC 4+: NEGATIVE
IMMATURE GRAN: 0.4 % (ref 0–0.3)
INR: 0.9 (ref 0.9–1.1)
INSTR WBC: 11.9 K/CUMM (ref 4–11)
LIPEMIC 3+: NEGATIVE
LYMPHOCYTE: 15 %
MCH RBC QN AUTO: 29 PG (ref 25–35)
MCHC RBC AUTO-ENTMCNC: 31 G/DL (ref 32–37)
MCV RBC AUTO: 92 FL (ref 78–97)
MONOCYTE: 6 %
NEUTROPHIL: 77 %
NRBC BLD MANUAL-RTO: 0 % (ref 0–0.2)
PLATELET: 221 K/CUMM (ref 150–450)
POTASSIUM: 4.7 MEQ/L (ref 3.5–5.1)
PROTHROMBIN TIME: 10.1 SECONDS (ref 9.7–11.6)
RBC # BLD: 4.09 M/CUMM (ref 4.2–6)
RDW: 14.2 % (ref 11.5–14.5)
SODIUM: 142 MEQ/L (ref 136–145)
TCO2: 27 MEQ/L (ref 19–29)
TOTAL PROTEIN: 5.8 G/DL (ref 6.4–8.3)
TROPONIN I: 0.01 NG/ML
WBC # BLD: 11.9 K/CUMM (ref 4–11)

## 2019-10-17 ENCOUNTER — HOSPITAL (OUTPATIENT)
Dept: OTHER | Age: 79
End: 2019-10-17

## 2019-10-17 LAB
ABS LYMPH: 1.9 K/CUMM (ref 1–3.5)
ABS MONO: 0.6 K/CUMM (ref 0.1–0.8)
ABS NEUTRO: 5.5 K/CUMM (ref 2–8)
ANION GAP SERPL CALC-SCNC: 10 MEQ/L (ref 10–20)
BASOPHIL: 1 % (ref 0–1)
BUN SERPL-MCNC: 19 MG/DL (ref 6–20)
C DIFF COMMENT: ABNORMAL
C DIFF EIA COMMENT: ABNORMAL
C DIFF TOXIN PCR: POSITIVE
CALCIUM: 8.3 MG/DL (ref 8.4–10.2)
CDIFF TOXIN, EIA: DETECTED
CHLORIDE: 111 MEQ/L (ref 97–107)
CREATININE: 1.12 MG/DL (ref 0.6–1.3)
DIFF_TYPE?: ABNORMAL
EOSINOPHIL: 3 % (ref 0–6)
GLUCOSE LVL: 75 MG/DL (ref 70–99)
GROSS: ABNORMAL
HCT VFR BLD CALC: 33 % (ref 36–51)
HEMOLYSIS 2+: NEGATIVE
HGB BLD-MCNC: 10.2 G/DL (ref 12–17)
IMMATURE GRAN: 0.5 % (ref 0–0.3)
INSTR WBC: 8.3 K/CUMM (ref 4–11)
LYMPHOCYTE: 22 %
MCH RBC QN AUTO: 28 PG (ref 25–35)
MCHC RBC AUTO-ENTMCNC: 31 G/DL (ref 32–37)
MCV RBC AUTO: 91 FL (ref 78–97)
MONOCYTE: 7 %
NEUTROPHIL: 66 %
NRBC BLD MANUAL-RTO: 0 % (ref 0–0.2)
PLATELET: 194 K/CUMM (ref 150–450)
POTASSIUM: 4.1 MEQ/L (ref 3.5–5.1)
RBC # BLD: 3.6 M/CUMM (ref 4.2–6)
RDW: 14.2 % (ref 11.5–14.5)
SODIUM: 141 MEQ/L (ref 136–145)
TCO2: 24 MEQ/L (ref 19–29)
UA APPEAR: CLEAR
UA BILI: NEGATIVE
UA BLOOD: NEGATIVE
UA COLOR: YELLOW
UA GLUCOSE: NEGATIVE
UA KETONES: ABNORMAL
UA LEUK EST: NEGATIVE
UA NITRITE: NEGATIVE
UA PH: 5.5 (ref 5–7)
UA PROTEIN: NEGATIVE
UA SPEC GRAV: 1.02 (ref 1.01–1.02)
UA UROBILINOGEN: 0.2 MG/DL (ref 0.2–1)
WBC # BLD: 8.3 K/CUMM (ref 4–11)

## 2019-10-18 LAB
A/G RATIO_: 0.8
ABS LYMPH: 1.4 K/CUMM (ref 1–3.5)
ABS MONO: 0.7 K/CUMM (ref 0.1–0.8)
ABS NEUTRO: 8.4 K/CUMM (ref 2–8)
ALBUMIN: 2.1 G/DL (ref 3.5–5)
ALK PHOS: 96 UNIT/L (ref 50–124)
ALT/GPT: 7 UNIT/L (ref 0–55)
ANION GAP SERPL CALC-SCNC: 11 MEQ/L (ref 10–20)
AST/GOT: 9 UNIT/L (ref 5–34)
BASOPHIL: 0 % (ref 0–1)
BILI TOTAL: 0.3 MG/DL (ref 0.2–1)
BUN SERPL-MCNC: 13 MG/DL (ref 6–20)
CALCIUM: 8.1 MG/DL (ref 8.4–10.2)
CHLORIDE: 109 MEQ/L (ref 97–107)
CREATININE: 1.1 MG/DL (ref 0.6–1.3)
DIFF_TYPE?: ABNORMAL
EOSINOPHIL: 2 % (ref 0–6)
GLOBULIN_: 2.6 G/DL (ref 2–4.1)
GLUCOSE LVL: 74 MG/DL (ref 70–99)
HCT VFR BLD CALC: 34 % (ref 36–51)
HEMOLYSIS 2+: NEGATIVE
HGB BLD-MCNC: 10.4 G/DL (ref 12–17)
IMMATURE GRAN: 0.7 % (ref 0–0.3)
INSTR WBC: 10.7 K/CUMM (ref 4–11)
LIPEMIC 3+: NEGATIVE
LYMPHOCYTE: 13 %
MCH RBC QN AUTO: 28 PG (ref 25–35)
MCHC RBC AUTO-ENTMCNC: 30 G/DL (ref 32–37)
MCV RBC AUTO: 94 FL (ref 78–97)
MONOCYTE: 6 %
NEUTROPHIL: 78 %
NRBC BLD MANUAL-RTO: 0 % (ref 0–0.2)
PLATELET: 169 K/CUMM (ref 150–450)
POTASSIUM: 4 MEQ/L (ref 3.5–5.1)
RBC # BLD: 3.65 M/CUMM (ref 4.2–6)
RDW: 14.2 % (ref 11.5–14.5)
SODIUM: 139 MEQ/L (ref 136–145)
TCO2: 23 MEQ/L (ref 19–29)
TOTAL PROTEIN: 4.7 G/DL (ref 6.4–8.3)
WBC # BLD: 10.7 K/CUMM (ref 4–11)

## 2019-10-19 LAB
A/G RATIO_: 0.7
ABS LYMPH: 1.7 K/CUMM (ref 1–3.5)
ABS MONO: 0.6 K/CUMM (ref 0.1–0.8)
ABS NEUTRO: 6.2 K/CUMM (ref 2–8)
ALBUMIN: 2 G/DL (ref 3.5–5)
ALK PHOS: 93 UNIT/L (ref 50–124)
ALT/GPT: <6 UNIT/L (ref 0–55)
ANION GAP SERPL CALC-SCNC: 10 MEQ/L (ref 10–20)
AST/GOT: 9 UNIT/L (ref 5–34)
BASOPHIL: 0 % (ref 0–1)
BILI TOTAL: 0.3 MG/DL (ref 0.2–1)
BUN SERPL-MCNC: 13 MG/DL (ref 6–20)
CALCIUM: 8.1 MG/DL (ref 8.4–10.2)
CHLORIDE: 112 MEQ/L (ref 97–107)
CREATININE: 1.01 MG/DL (ref 0.6–1.3)
DIFF_TYPE?: ABNORMAL
EOSINOPHIL: 3 % (ref 0–6)
GLOBULIN_: 2.7 G/DL (ref 2–4.1)
GLUCOSE LVL: 82 MG/DL (ref 70–99)
HCT VFR BLD CALC: 33 % (ref 36–51)
HEMOLYSIS 2+: NEGATIVE
HGB BLD-MCNC: 10.1 G/DL (ref 12–17)
IMMATURE GRAN: 0.5 % (ref 0–0.3)
INSTR WBC: 8.8 K/CUMM (ref 4–11)
LIPEMIC 3+: NEGATIVE
LYMPHOCYTE: 19 %
MCH RBC QN AUTO: 28 PG (ref 25–35)
MCHC RBC AUTO-ENTMCNC: 31 G/DL (ref 32–37)
MCV RBC AUTO: 92 FL (ref 78–97)
MONOCYTE: 7 %
NEUTROPHIL: 70 %
NRBC BLD MANUAL-RTO: 0 % (ref 0–0.2)
PLATELET: 178 K/CUMM (ref 150–450)
POTASSIUM: 3.7 MEQ/L (ref 3.5–5.1)
RBC # BLD: 3.54 M/CUMM (ref 4.2–6)
RDW: 14.2 % (ref 11.5–14.5)
SODIUM: 141 MEQ/L (ref 136–145)
TCO2: 23 MEQ/L (ref 19–29)
TOTAL PROTEIN: 4.7 G/DL (ref 6.4–8.3)
WBC # BLD: 8.8 K/CUMM (ref 4–11)

## 2019-10-20 LAB
ANION GAP SERPL CALC-SCNC: 8 MEQ/L (ref 10–20)
BUN SERPL-MCNC: 10 MG/DL (ref 6–20)
CALCIUM: 8.4 MG/DL (ref 8.4–10.2)
CHLORIDE: 109 MEQ/L (ref 97–107)
CREATININE: 1.02 MG/DL (ref 0.6–1.3)
GLUCOSE LVL: 93 MG/DL (ref 70–99)
HEMOLYSIS 2+: NEGATIVE
HEMOLYSIS 2+: NEGATIVE
MAGNESIUM LEVEL: 1.3 MG/DL (ref 1.6–2.6)
POTASSIUM: 4 MEQ/L (ref 3.5–5.1)
SODIUM: 138 MEQ/L (ref 136–145)
TCO2: 25 MEQ/L (ref 19–29)

## 2019-10-23 LAB
DEVICE SN: NORMAL
HEMOLYSIS 2+: NEGATIVE
MAGNESIUM LEVEL: 1.3 MG/DL (ref 1.6–2.6)
POC_GLU: 76 MG/DL (ref 70–99)
TECH_ID: NORMAL

## 2019-10-24 LAB
HEMOLYSIS 2+: NEGATIVE
MAGNESIUM LEVEL: 1.7 MG/DL (ref 1.6–2.6)

## 2019-10-26 ENCOUNTER — HOSPITAL (OUTPATIENT)
Dept: OTHER | Age: 79
End: 2019-10-26
Attending: EMERGENCY MEDICINE

## 2019-10-26 LAB
A/G RATIO_: 0.8
ABS LYMPH: 2 K/CUMM (ref 1–3.5)
ABS MONO: 0.7 K/CUMM (ref 0.1–0.8)
ABS NEUTRO: 5.2 K/CUMM (ref 2–8)
ALBUMIN: 2.7 G/DL (ref 3.5–5)
ALK PHOS: 93 UNIT/L (ref 50–124)
ALT/GPT: 19 UNIT/L (ref 0–55)
ANION GAP SERPL CALC-SCNC: 11 MEQ/L (ref 10–20)
APTT PPP: 27 SECONDS (ref 22–30)
AST/GOT: 22 UNIT/L (ref 5–34)
BASOPHIL: 0 % (ref 0–1)
BILI TOTAL: 0.3 MG/DL (ref 0.2–1)
BUN SERPL-MCNC: 14 MG/DL (ref 6–20)
CALCIUM: 9.5 MG/DL (ref 8.4–10.2)
CHLORIDE: 104 MEQ/L (ref 97–107)
CREATININE: 1.17 MG/DL (ref 0.6–1.3)
DEVICE SN: NORMAL
DIFF_TYPE?: ABNORMAL
EOSINOPHIL: 3 % (ref 0–6)
GLOBULIN_: 3.3 G/DL (ref 2–4.1)
GLUCOSE LVL: 90 MG/DL (ref 70–99)
HCT VFR BLD CALC: 35 % (ref 36–51)
HEMOLYSIS 2+: NEGATIVE
HEMOLYSIS 4+: NEGATIVE
HGB BLD-MCNC: 11.2 G/DL (ref 12–17)
ICTERIC 4+: NEGATIVE
IMMATURE GRAN: 0.4 % (ref 0–0.3)
INR: 1 (ref 0.9–1.1)
INSTR WBC: 8.2 K/CUMM (ref 4–11)
LACTIC ACID: 0.9 MMOL/L (ref 0.5–2.2)
LIPEMIC 1+: NEGATIVE
LIPEMIC 3+: NEGATIVE
LYMPHOCYTE: 24 %
MCH RBC QN AUTO: 29 PG (ref 25–35)
MCHC RBC AUTO-ENTMCNC: 32 G/DL (ref 32–37)
MCV RBC AUTO: 90 FL (ref 78–97)
MONOCYTE: 8 %
NEUTROPHIL: 64 %
NRBC BLD MANUAL-RTO: 0 % (ref 0–0.2)
PLATELET: 236 K/CUMM (ref 150–450)
POC_GLU: 86 MG/DL (ref 70–99)
POTASSIUM: 4.5 MEQ/L (ref 3.5–5.1)
PROTHROMBIN TIME: 10.2 SECONDS (ref 9.7–11.6)
RBC # BLD: 3.9 M/CUMM (ref 4.2–6)
RDW: 15.4 % (ref 11.5–14.5)
SODIUM: 141 MEQ/L (ref 136–145)
TCO2: 31 MEQ/L (ref 19–29)
TECH_ID: NORMAL
TOTAL PROTEIN: 6 G/DL (ref 6.4–8.3)
UA APPEAR: CLEAR
UA BACTERIA: ABNORMAL
UA BILI: NEGATIVE
UA BLOOD: ABNORMAL
UA CAST: ABNORMAL
UA COLOR: YELLOW
UA CRYSTAL: ABNORMAL
UA EPITHELIAL: ABNORMAL
UA GLUCOSE: NEGATIVE
UA KETONES: NEGATIVE
UA LEUK EST: ABNORMAL
UA MUCOUS: ABNORMAL
UA NITRITE: NEGATIVE
UA PH: 5.5 (ref 5–7)
UA PROTEIN: NEGATIVE
UA RBC: ABNORMAL
UA SPEC GRAV: 1.02 (ref 1.01–1.02)
UA UROBILINOGEN: 0.2 MG/DL (ref 0.2–1)
UA WBC: ABNORMAL
WBC # BLD: 8.2 K/CUMM (ref 4–11)

## 2019-10-26 PROCEDURE — 93010 ELECTROCARDIOGRAM REPORT: CPT | Performed by: INTERNAL MEDICINE

## 2022-04-20 NOTE — PROGRESS NOTES
Therapeutic Substitution Note    Lovastatin 40mg is non-formulary and was auto-substituted to Atorvastatin 10mg   Tamsulosin 0.4mg is non-formulary and was auto-substituted to Alfuzosin 10mg   Valacyclovir 500mg daily is non-formulary and was auto-substitu Dutasteride Male Counseling: Dustasteride Counseling:  I discussed with the patient the risks of use of dutasteride including but not limited to decreased libido, decreased ejaculate volume, and gynecomastia. Women who can become pregnant should not handle medication.  All of the patient's questions and concerns were addressed. Dutasteride Counseling: Dustasteride Counseling:  I discussed with the patient the risks of use of dutasteride including but not limited to decreased libido, decreased ejaculate volume, and gynecomastia. Women who can become pregnant should not handle medication.  All of the patient's questions and concerns were addressed.

## (undated) DEVICE — SUTURE ETHIBOND 2 V-37

## (undated) DEVICE — ANTERIOR HIP: Brand: MEDLINE INDUSTRIES, INC.

## (undated) DEVICE — PHOTONSABER Y METAL TIP

## (undated) DEVICE — SUTURE VLOC 90 2-0 9\" 2145

## (undated) DEVICE — POSITIONER OR KT PT CR

## (undated) DEVICE — SOLUTION SURG DURA PREP HAZMAT

## (undated) DEVICE — FAN SPRAY KIT: Brand: PULSAVAC®

## (undated) DEVICE — PINNACLE GRIPTION ACETABULAR SHELL SECTOR 56MM OD
Type: IMPLANTABLE DEVICE | Site: HIP | Status: NON-FUNCTIONAL
Brand: PINNACLE GRIPTION
Removed: 2017-08-22

## (undated) DEVICE — SOL  .9 1000ML BTL

## (undated) DEVICE — BATTERY

## (undated) DEVICE — SUTURE MONOCRYL 2-0 SH

## (undated) DEVICE — WEBRIL COTTON UNDERCAST PADDING: Brand: WEBRIL

## (undated) DEVICE — CONTAINER SPEC STR 4OZ GRY LID

## (undated) DEVICE — NEEDLE SPINAL 18X3-1/2 PINK.

## (undated) DEVICE — SUTURE MONOCRYL 4-0 Y845G

## (undated) DEVICE — DRAPE SRG U 124X80IN U REINF

## (undated) DEVICE — CHLORAPREP 26ML APPLICATOR

## (undated) DEVICE — STERILE LATEX POWDER-FREE SURGICAL GLOVESWITH NITRILE COATING: Brand: PROTEXIS

## (undated) DEVICE — COVER SGL STRL LGHT HNDL BLU

## (undated) DEVICE — PEN: MARKING STD PT 100/CS: Brand: MEDICAL ACTION INDUSTRIES

## (undated) DEVICE — PERINEAL POST PAD 1

## (undated) DEVICE — 2T11 #2 PDO 36 X 36: Brand: 2T11 #2 PDO 36 X 36

## (undated) DEVICE — SOL  .9 1000ML BAG

## (undated) DEVICE — Device: Brand: STABLECUT®

## (undated) DEVICE — SUCTION CANISTER, 3000CC,SAFELINER: Brand: DEROYAL

## (undated) DEVICE — TRAY FOLEY BDX 16F STATLOCK

## (undated) DEVICE — 3M™ STERI-DRAPE™ U-DRAPE 1015: Brand: STERI-DRAPE™

## (undated) DEVICE — 60 ML SYRINGE LUER-LOCK TIP: Brand: MONOJECT

## (undated) DEVICE — T5 HOOD WITH PEEL AWAY FACE SHIELD

## (undated) DEVICE — DRESSING AQUACEL AG 3.5 X 10

## (undated) DEVICE — DERMABOND LIQUID ADHESIVE

## (undated) DEVICE — STERILE LATEX POWDER-FREE SURGICAL GLOVES WITH HYDROGEL COATING, SMOOTH FINISH, STRAIGHT FINGER: Brand: PROTEXIS

## (undated) DEVICE — POUCH: SSEAL TYVEK 2000/CS: Brand: MEDICAL ACTION INDUSTRIES

## (undated) DEVICE — BIPOLAR SEALER 23-112-1 AQM 6.0: Brand: AQUAMANTYS™